# Patient Record
(demographics unavailable — no encounter records)

---

## 2025-04-05 NOTE — DISCUSSION/SUMMARY
[FreeTextEntry1] : Patient presented for preoperative consultation prior to facial feminization surgery on 4/30/25 We reviewed the virtual planning with the patient She desires to have the following facial features modified: -Brow -Nose -Jawline -Neck We reviewed these procedures and their risks   FH: noncontributory   SH: no secondary tobacco use,  PMHx: - HIV+ - Depression  Surgical History Surgery Type: GRS Location: [facility] Surgeon: Jorge COTTON Year: 2/12/2024 Complications?: "little tissue left over" - planning to have revision.  Surgery Type: Breast augmentation Location: [place], [place] Surgeon: MD Gavino Year: April 2024 Complications?: None ROS: General health / Constitutional      no fever, no chills, no unusual weight changes, no energy level changes, no night sweats Skin       No color or pigmentation changes, no pruritis, no rashes, no ulcers, Hair       No changes in color, texture,  distribution, loss Nails       No color changes, brittleness, infection Head       No headaches, no new jaw pain Eyes       Good visual acuity, no color blindness, no corrective lenses, no photophobia, no diplopia, no blurred vision, no infection, pain, no medications, Ear      no tinnitus, no discharge, no pain, no medications Nose      no epistaxis, no rhinorrhea, no rhinitis, no pain, Mouth & Throat      no gingivitis, no gingival bleeding, no ulcers, no voice changes, no changes in oral mucosa or tongue Neck      no stiffness, no pain, no lymphadenitis, no thyroid disorders, Respiratory      no cough, no dyspnea, no wheezing,  no chest pain, cyanosis, no pneumonia, no asthma, Cardiovascular      no chest pain, no palpitations, no irregular rhythm, no tachycardia, no bradycardia, no heart failure, no dyspnea on exertion (MONTANO), no edema Gastrointestinal      no nausea / vomiting, no dysphagia, no reflux / GERD, no abdominal pain, no jaundice Musculoskeletal      no pain in muscles, bones, or joints; no fractures, no dislocations, no muscular weakness, no atrophy Neurological      no paresis, no paralysis, no paresthesia, no seizures, no dizziness, no syncope, no ataxia, no tremor Psychological      no childhood behavioral problems, no irritability, no sleep changes Hematological      no anemia, no bruising, no bleeding tendencies,   PHYSICAL EXAM General WDWN, in no distress,  A & O x 3 (person, place, time) HEENT Head: AT/NC (atraumatic, normocephalic), including TMJ, sensory and motor; Prominent brow and lateral orbital rim type III Eyes: EOMI, PERRLA Ears: exterior, nl hearing, Nose: slightly wide, bulbous nasal tip with acute nasiolabial angle intranasal exam showed enlarged turbinates and deviated caudal septum Throat & mouth: gd palate elevation, nl tongue mobility, nl tonsil size Prominent mandibular angle with active masseter Wide chin   Neck: no masses, nl pulses +excess submental fat    FFS: -Brow lift -frontal sinus setback -supraorbital brow recontouring -mandibular angle reduction b/l -chin narrowing -rhinoplasty, SMR, cartilage grafting -submental fat excision, platysmaplasty   Patient informed of the timing and risks moving forward. All patient questions were answered. Patient was given written instructions for care and follow up visit. We discussed the instructions and the patient confirmed an understanding of them.   Our team also spent additional time to determine best operative strategies for this patient using 3D imaging and morphing program (Deep Surface AI). We took 3D images and downloaded them to the software necessary for modifying each facial region to replicated surgical changes that are possible. This was used as a communication tool for the patients to determine which facial features were dysphoric and to determine how surgical modification will help with each feature. It was also used to determine how much modification would be best for this individual patient.  We explained potential risks and complications including the ones below and that there may be others not documented here. Patient understood the risks.   Bleeding- It is possible, though unusual, to experience a bleeding episode during or after surgery. Should post-operative bleeding occur, it may require emergency treatment to drain accumulated blood or blood transfusion. Intra-operative blood transfusion may also be required. Do not take any aspirin or anti-inflammatory medications for ten days before or after surgery, as this may increase the risk of bleeding. Non-prescription herbs and dietary supplements can increase the risk of surgical bleeding. Hematoma can occur at any time following injury to the breast. If blood transfusions are necessary to treat blood loss, there is the risk of blood-related infections such as hepatitis and HIV (AIDS). Heparin medications that are used to prevent blood clots in veins can produce bleeding and decreased blood platelets.   Infection- Infection is unusual after surgery. Should an infection occur, additional treatment including antibiotics, hospitalization, or additional surgery may be necessary.   Change Skin Sensation- You may experience a diminished (or loss) of sensitivity of the skin of operative area. Partial or permanent loss of skin sensation can occur after surgery. Skin Contour Irregularities- Contour and shape irregularities may occur after surgery. Visible and palpable wrinkling may occur. Residual skin irregularities at the ends of the incisions or dog ears are always a possibility when there is excessive redundant skin. This may improve with time, or it can be surgically corrected.   Sutures- Most surgical techniques use deep sutures. You may notice these sutures after your surgery. Sutures may spontaneously poke through the skin, become visible or produce irritation that requires suture removal.   Skin Discoloration / Swelling- Some bruising and swelling normally occurs following surgery. The skin in or near the surgical site can appear either lighter or darker than surrounding skin. Although uncommon, swelling and skin discoloration may persist for long periods of time and, in rare situations, may be permanent.   Skin Sensitivity- Itching, tenderness, or exaggerated responses to hot or cold temperatures may occur after surgery. Usually this resolves during healing, but in rare situations it may be chronic.   Scarring- All surgery leaves scars, some more visible than others. Although good wound healing after a surgical procedure is expected, abnormal scars may occur within the skin and deeper tissues. Scars may be unattractive and of different color than the surrounding skin tone. Scar appearance may also vary within the same scar. Scars may be asymmetrical (appear different on the right and left side of the body). There is the possibility of visible marks in the skin from sutures. In some cases scars may require surgical revision or treatment.   Damage to Deeper Structures- There is the potential for injury to deeper structures including, nerves, blood vessels, muscles during any surgical procedure. The potential for this to occur varies according to the type of procedure being performed. Injury to deeper structures may be temporary or permanent.   Delayed Healing- Wound disruption or delayed wound healing is possible. Some areas of the skin may not heal normally and may take a long time to heal. Areas of skin tissue may die. This may require frequent dressing changes or further surgery to remove the non-healed tissue. Individuals who have decreased blood supply to tissue from past surgery or radiation therapy may be at increased risk for wound healing and poor surgical outcome. Smokers have a greater risk of skin loss and wound healing complications.   Fat Necrosis- Fatty tissue found deep in the skin might die. This may produce areas of firmness within the skin. Additional surgery to remove areas of fat necrosis may be necessary. There is the possibility of contour irregularities in the skin that may result from fat necrosis.   Allergic Reactions- In rare cases, local allergies to tape, suture material and glues, blood products, topical preparations or injected agents have been reported. Serious systemic reactions including shock (anaphylaxis) may occur in response to drugs used during surgery and prescription medicines. Allergic reactions may require additional treatment.    Allergies: - None  Meds: - progesterone injection every 3 months. - estradiol injection every 1 week. - Biktarvy  Denies previous botox, fillers or silicone injections.  SH: Denies marijuana use, Denies Cigarette use  FFS: 03682: Frontal sinus anterior wall setback 10482: Orbital reconstruction bilateral 93384: Hairline lowering 95873: Browlift bilateral 85869: Supraorbital bar recontouring bilateral 87438: Tarsorrhaphy bilateral 27608: Mandibular angle contouring 40073-59: Mandibular angle osteotomy 69465: Osseous genioplasty narrowing, shortening 91964: Rhinoplasty open 06600: Submucous resection of septum 97990: Cartilage grafting for nasal reconstruction, use of cadaveric cartilage for  grafts, columellar strut, tip graft 02373: Submental fat excision 80427: platysmaplasty 40799 x 2: Upper and lower lip augmentation with temporalis fascia 84420: Fat grafting to malar and nasolabial fold regions bilateral from abdomen first 25 ccs  Spend 45 minutes with patient and family discussing the diagnosis and treatment plan.  Patient and family informed of the timing and risks moving forward. All patient questions were answered.

## 2025-04-05 NOTE — DISCUSSION/SUMMARY
[FreeTextEntry1] : Patient presented for preoperative consultation prior to facial feminization surgery on 4/30/25 We reviewed the virtual planning with the patient She desires to have the following facial features modified: -Brow -Nose -Jawline -Neck We reviewed these procedures and their risks   FH: noncontributory   SH: no secondary tobacco use,  PMHx: - HIV+ - Depression  Surgical History Surgery Type: GRS Location: [facility] Surgeon: Jorge COTTON Year: 2/12/2024 Complications?: "little tissue left over" - planning to have revision.  Surgery Type: Breast augmentation Location: [place], [place] Surgeon: MD Gavino Year: April 2024 Complications?: None ROS: General health / Constitutional      no fever, no chills, no unusual weight changes, no energy level changes, no night sweats Skin       No color or pigmentation changes, no pruritis, no rashes, no ulcers, Hair       No changes in color, texture,  distribution, loss Nails       No color changes, brittleness, infection Head       No headaches, no new jaw pain Eyes       Good visual acuity, no color blindness, no corrective lenses, no photophobia, no diplopia, no blurred vision, no infection, pain, no medications, Ear      no tinnitus, no discharge, no pain, no medications Nose      no epistaxis, no rhinorrhea, no rhinitis, no pain, Mouth & Throat      no gingivitis, no gingival bleeding, no ulcers, no voice changes, no changes in oral mucosa or tongue Neck      no stiffness, no pain, no lymphadenitis, no thyroid disorders, Respiratory      no cough, no dyspnea, no wheezing,  no chest pain, cyanosis, no pneumonia, no asthma, Cardiovascular      no chest pain, no palpitations, no irregular rhythm, no tachycardia, no bradycardia, no heart failure, no dyspnea on exertion (MONTANO), no edema Gastrointestinal      no nausea / vomiting, no dysphagia, no reflux / GERD, no abdominal pain, no jaundice Musculoskeletal      no pain in muscles, bones, or joints; no fractures, no dislocations, no muscular weakness, no atrophy Neurological      no paresis, no paralysis, no paresthesia, no seizures, no dizziness, no syncope, no ataxia, no tremor Psychological      no childhood behavioral problems, no irritability, no sleep changes Hematological      no anemia, no bruising, no bleeding tendencies,   PHYSICAL EXAM General WDWN, in no distress,  A & O x 3 (person, place, time) HEENT Head: AT/NC (atraumatic, normocephalic), including TMJ, sensory and motor; Prominent brow and lateral orbital rim type III Eyes: EOMI, PERRLA Ears: exterior, nl hearing, Nose: slightly wide, bulbous nasal tip with acute nasiolabial angle intranasal exam showed enlarged turbinates and deviated caudal septum Throat & mouth: gd palate elevation, nl tongue mobility, nl tonsil size Prominent mandibular angle with active masseter Wide chin   Neck: no masses, nl pulses +excess submental fat    FFS: -Brow lift -frontal sinus setback -supraorbital brow recontouring -mandibular angle reduction b/l -chin narrowing -rhinoplasty, SMR, cartilage grafting -submental fat excision, platysmaplasty   Patient informed of the timing and risks moving forward. All patient questions were answered. Patient was given written instructions for care and follow up visit. We discussed the instructions and the patient confirmed an understanding of them.   Our team also spent additional time to determine best operative strategies for this patient using 3D imaging and morphing program (Deep Surface AI). We took 3D images and downloaded them to the software necessary for modifying each facial region to replicated surgical changes that are possible. This was used as a communication tool for the patients to determine which facial features were dysphoric and to determine how surgical modification will help with each feature. It was also used to determine how much modification would be best for this individual patient.  We explained potential risks and complications including the ones below and that there may be others not documented here. Patient understood the risks.   Bleeding- It is possible, though unusual, to experience a bleeding episode during or after surgery. Should post-operative bleeding occur, it may require emergency treatment to drain accumulated blood or blood transfusion. Intra-operative blood transfusion may also be required. Do not take any aspirin or anti-inflammatory medications for ten days before or after surgery, as this may increase the risk of bleeding. Non-prescription herbs and dietary supplements can increase the risk of surgical bleeding. Hematoma can occur at any time following injury to the breast. If blood transfusions are necessary to treat blood loss, there is the risk of blood-related infections such as hepatitis and HIV (AIDS). Heparin medications that are used to prevent blood clots in veins can produce bleeding and decreased blood platelets.   Infection- Infection is unusual after surgery. Should an infection occur, additional treatment including antibiotics, hospitalization, or additional surgery may be necessary.   Change Skin Sensation- You may experience a diminished (or loss) of sensitivity of the skin of operative area. Partial or permanent loss of skin sensation can occur after surgery. Skin Contour Irregularities- Contour and shape irregularities may occur after surgery. Visible and palpable wrinkling may occur. Residual skin irregularities at the ends of the incisions or dog ears are always a possibility when there is excessive redundant skin. This may improve with time, or it can be surgically corrected.   Sutures- Most surgical techniques use deep sutures. You may notice these sutures after your surgery. Sutures may spontaneously poke through the skin, become visible or produce irritation that requires suture removal.   Skin Discoloration / Swelling- Some bruising and swelling normally occurs following surgery. The skin in or near the surgical site can appear either lighter or darker than surrounding skin. Although uncommon, swelling and skin discoloration may persist for long periods of time and, in rare situations, may be permanent.   Skin Sensitivity- Itching, tenderness, or exaggerated responses to hot or cold temperatures may occur after surgery. Usually this resolves during healing, but in rare situations it may be chronic.   Scarring- All surgery leaves scars, some more visible than others. Although good wound healing after a surgical procedure is expected, abnormal scars may occur within the skin and deeper tissues. Scars may be unattractive and of different color than the surrounding skin tone. Scar appearance may also vary within the same scar. Scars may be asymmetrical (appear different on the right and left side of the body). There is the possibility of visible marks in the skin from sutures. In some cases scars may require surgical revision or treatment.   Damage to Deeper Structures- There is the potential for injury to deeper structures including, nerves, blood vessels, muscles during any surgical procedure. The potential for this to occur varies according to the type of procedure being performed. Injury to deeper structures may be temporary or permanent.   Delayed Healing- Wound disruption or delayed wound healing is possible. Some areas of the skin may not heal normally and may take a long time to heal. Areas of skin tissue may die. This may require frequent dressing changes or further surgery to remove the non-healed tissue. Individuals who have decreased blood supply to tissue from past surgery or radiation therapy may be at increased risk for wound healing and poor surgical outcome. Smokers have a greater risk of skin loss and wound healing complications.   Fat Necrosis- Fatty tissue found deep in the skin might die. This may produce areas of firmness within the skin. Additional surgery to remove areas of fat necrosis may be necessary. There is the possibility of contour irregularities in the skin that may result from fat necrosis.   Allergic Reactions- In rare cases, local allergies to tape, suture material and glues, blood products, topical preparations or injected agents have been reported. Serious systemic reactions including shock (anaphylaxis) may occur in response to drugs used during surgery and prescription medicines. Allergic reactions may require additional treatment.    Allergies: - None  Meds: - progesterone injection every 3 months. - estradiol injection every 1 week. - Biktarvy  Denies previous botox, fillers or silicone injections.  SH: Denies marijuana use, Denies Cigarette use  FFS: 89913: Frontal sinus anterior wall setback 75978: Orbital reconstruction bilateral 26502: Hairline lowering 95540: Browlift bilateral 29318: Supraorbital bar recontouring bilateral 46417: Tarsorrhaphy bilateral 12980: Mandibular angle contouring 76710-65: Mandibular angle osteotomy 48233: Osseous genioplasty narrowing, shortening 47366: Rhinoplasty open 81929: Submucous resection of septum 32679: Cartilage grafting for nasal reconstruction, use of cadaveric cartilage for  grafts, columellar strut, tip graft 51908: Submental fat excision 10830: platysmaplasty 40799 x 2: Upper and lower lip augmentation with temporalis fascia 74299: Fat grafting to malar and nasolabial fold regions bilateral from abdomen first 25 ccs  Spend 45 minutes with patient and family discussing the diagnosis and treatment plan.  Patient and family informed of the timing and risks moving forward. All patient questions were answered.

## 2025-05-11 NOTE — DISCUSSION/SUMMARY
[FreeTextEntry1] : Kait presents 3 weeks post-operatively, s/p FFS (brow lift, frontal sinus setback, SOB recontouring, jawline and chin, rhinoplasty, platysmaplasty).   Patient recovering well with no significant complaints; denies pain and has discontinued all pain medication. Mild swelling of lower face, mild numbness along surgical sites (hairline, jawline, submentum), no bruising.   -Little to no ecchymosis present about periorbital and mid face regions and moderate edema. Reassured patient. -Patient denies needing refills on medication, discontinued all pain medications -Maintaining good oral care/hygiene, using regular mouthwash and maintaining regular oral hygiene with gentle brushing. -Patient maintains soft food diet, progressing to normal diet slowly.. Denies nausea or vomiting.   -Nose-taping daily, can progress to just nightly. -Patient can use saline nasal spray prn. Use Afrin prn congestion 2x daily for a maximum for 2 days. Incorporate humidifier if dry air at home.  -Incision sites are healing well, well-approximated with no evidence of infection. Incision sites located at submentum, nostrils, intraorally, and at hairline. -Negative for signs of inflammation (redness, significant pain or swelling, discharge, foul odor/smell). -Patient to cover incision sites of scalp with scarf/hat if in direct sunlight. -Reviewed incision care. Patient cleanses daily, hairline every other day. Using hydrogen peroxide on clean q-tip to gently remove build-up and dried blood (especially at nose and hairline). -At this time, all non-dissolvable sutures have been removed. -Educated patient on dissolvable nature of all other sutures.    Continue to: -Patient to continue to use jawbra nightly, can use daily at home if she desires. -Maintain head elevation while sleeping, at least 30 degrees, using two to three pillows or a wedge pillow. -Use bacitracin ointment on surgical incisions about nose, keep lips hydrated with vaseline.     -Patient to contact office or come in sooner than follow-up appointment if she notices discharge/drainage, foul odor/taste, uncontrolled bleeding, severe pain not improved with pain-medication regimen, dramatic increase in swelling, nausea//vomiting, chills/fever, or onset of new symptoms.   -Reviewed common post-op occurrences and adverse reactions. All of patient's questions and concerns were answered and addressed during this appointment.   -Patient pending clearance for scar gels/sunscreen use/ exercise/laser/electrolysis. Patient can anticipate returning to these activities around 6 weeks post-operatively.   -Patient to advance activity as tolerated, but is not encouraged to engage in exercise, heavy lifting, or having head below heart for prolonged duration.   -Patient to call to schedule follow up, follow-up in approximately 6 weeks

## 2025-06-01 NOTE — DISCUSSION/SUMMARY
[FreeTextEntry1] :  weeks post-operatively, s/p FFS (brow lift, frontal sinus setback, SOB recontouring, jawline and chin, rhinoplasty, platysmaplasty).   Patient recovering well with no significant complaints;  admits to moderate swelling of mid nose to nasal tip (exterior) as well as complaints of congestion at night. Reviewed with patient rpolonging her nasal taping and head elevation at night from initially recommended minimum of 6 weeks to 10 weeks. Include Afrin x max 2 days, at bedtime, prn congestion, sleep with humidified air.  Patient denies seasonal allergies, coughing, or sneezing. On physical exam, no apparent obstruction to nasal passages, mild edema of internal membranes Denies pain and has discontinued all  (pain) medication. Mild swelling of lower face, mild numbness along surgical sites (hairline, jawline, submentum), no bruising. -Patient denies needing refills on medication -Happy with results  -Maintaining good oral care/hygiene, using regular mouthwash and maintaining regular oral hygiene with gentle brushing. -Patient maintains returning to normal diet. Denies nausea or vomiting.  -Reviewed incision care. Patient cleanses daily, hairline every other day. Using hydrogen peroxide on clean q-tip to gently remove build-up and dried blood (especially at nose and hairline). -At this time, all non-dissolvable sutures have been removed. -Continue with scar gel.   On exam: -Incision sites are healing well, well-approximated with no evidence of infection. Incision sites located at submentum, nostrils, intraorally, and at hairline. -Negative for signs of inflammation (redness, significant pain or swelling, discharge, foul odor/smell). -Patient to cover incision sites of scalp with scarf/hat if in direct sunlight. Okay to use sunscreen. Limit activity with head below heart, risk of blows/trauma to face and strenuous activity like cardio or heavy lifting.  Follow up in 4 weeks.